# Patient Record
Sex: MALE | Race: WHITE
[De-identification: names, ages, dates, MRNs, and addresses within clinical notes are randomized per-mention and may not be internally consistent; named-entity substitution may affect disease eponyms.]

---

## 2018-01-03 ENCOUNTER — HOSPITAL ENCOUNTER (OUTPATIENT)
Dept: HOSPITAL 61 - KCIC CT | Age: 58
Discharge: HOME | End: 2018-01-03
Attending: PHYSICIAN ASSISTANT
Payer: COMMERCIAL

## 2018-01-03 DIAGNOSIS — Z87.891: ICD-10-CM

## 2018-01-03 DIAGNOSIS — J98.4: ICD-10-CM

## 2018-01-03 DIAGNOSIS — J84.10: ICD-10-CM

## 2018-01-03 DIAGNOSIS — J44.9: Primary | ICD-10-CM

## 2018-01-03 PROCEDURE — 71250 CT THORAX DX C-: CPT

## 2018-03-19 ENCOUNTER — HOSPITAL ENCOUNTER (OUTPATIENT)
Dept: HOSPITAL 61 - KCIC | Age: 58
Discharge: HOME | End: 2018-03-19
Attending: PHYSICIAN ASSISTANT
Payer: COMMERCIAL

## 2018-03-19 DIAGNOSIS — M79.672: Primary | ICD-10-CM

## 2018-03-19 PROCEDURE — 73630 X-RAY EXAM OF FOOT: CPT

## 2018-04-19 ENCOUNTER — HOSPITAL ENCOUNTER (OUTPATIENT)
Dept: HOSPITAL 61 - ENDOS | Age: 58
Discharge: HOME | End: 2018-04-19
Attending: INTERNAL MEDICINE
Payer: COMMERCIAL

## 2018-04-19 VITALS — SYSTOLIC BLOOD PRESSURE: 143 MMHG | DIASTOLIC BLOOD PRESSURE: 77 MMHG

## 2018-04-19 DIAGNOSIS — F32.9: ICD-10-CM

## 2018-04-19 DIAGNOSIS — Z90.49: ICD-10-CM

## 2018-04-19 DIAGNOSIS — Z86.010: ICD-10-CM

## 2018-04-19 DIAGNOSIS — M19.90: ICD-10-CM

## 2018-04-19 DIAGNOSIS — Z83.71: ICD-10-CM

## 2018-04-19 DIAGNOSIS — K21.9: ICD-10-CM

## 2018-04-19 DIAGNOSIS — K64.0: ICD-10-CM

## 2018-04-19 DIAGNOSIS — F41.9: ICD-10-CM

## 2018-04-19 DIAGNOSIS — Z98.890: ICD-10-CM

## 2018-04-19 DIAGNOSIS — Z79.899: ICD-10-CM

## 2018-04-19 DIAGNOSIS — E78.00: ICD-10-CM

## 2018-04-19 DIAGNOSIS — K63.5: ICD-10-CM

## 2018-04-19 DIAGNOSIS — F17.210: ICD-10-CM

## 2018-04-19 DIAGNOSIS — Z09: Primary | ICD-10-CM

## 2018-04-19 PROCEDURE — 45385 COLONOSCOPY W/LESION REMOVAL: CPT

## 2018-04-19 RX ADMIN — SODIUM CHLORIDE, SODIUM LACTATE, POTASSIUM CHLORIDE, AND CALCIUM CHLORIDE 1 MLS/HR: .6; .31; .03; .02 INJECTION, SOLUTION INTRAVENOUS at 12:30

## 2020-06-25 ENCOUNTER — HOSPITAL ENCOUNTER (OUTPATIENT)
Dept: HOSPITAL 61 - KCIC CT | Age: 60
Discharge: HOME | End: 2020-06-25
Attending: FAMILY MEDICINE
Payer: MEDICAID

## 2020-06-25 DIAGNOSIS — J44.9: ICD-10-CM

## 2020-06-25 DIAGNOSIS — K76.89: ICD-10-CM

## 2020-06-25 DIAGNOSIS — Z87.891: Primary | ICD-10-CM

## 2020-06-25 NOTE — [facility]
EXAM: CT low dose lung cancer screening.

 

HISTORY: Cigarette smoking history.

 

TECHNIQUE: Computed tomographic images of the chest were obtained without 

contrast. Multiplanar reformatting was performed.

 

*One or more of the following individualized dose reduction techniques 

were utilized for this examination:  

1. Automated exposure control.  

2. Adjustment of the mA and/or kV according to patient size.  

3. Use of iterative reconstruction technique.

 

COMPARISON: 1/3/2018.

 

FINDINGS: There is pulmonary emphysema with subpleural bleb formation. 

There is increased linear atelectasis or scarring within the right lower 

lobe in medial right middle lobe. There is bilateral basilar and posterior

dependent atelectasis.

 

There is a small amount of aspirated mucus within the right mainstem 

bronchus. There is slight central bronchial wall thickening. No infiltrate

or suspicious nodule is seen. The heart is normal in size. There is no 

lymphadenopathy. There is a tiny cyst within the inferior right hepatic 

lobe. No suspicious osseous lesion is seen.

 

IMPRESSION: 

1. No suspicious pulmonary nodule. Lung RADS category 1.

2. Pulmonary emphysema and slight central bronchial wall thickening 

suggesting the sequela of prior bronchitis. No acute infiltrate is seen.

3. Increase in linear atelectasis or scarring within the right middle and 

lower lobes.

 

Electronically signed by: Silvia Ellis MD (6/25/2020 12:57 PM) [id number]